# Patient Record
Sex: MALE | Race: WHITE | NOT HISPANIC OR LATINO | URBAN - METROPOLITAN AREA
[De-identification: names, ages, dates, MRNs, and addresses within clinical notes are randomized per-mention and may not be internally consistent; named-entity substitution may affect disease eponyms.]

---

## 2023-11-08 PROBLEM — R10.9 ABDOMINAL PAIN: Status: RESOLVED | Noted: 2023-11-08 | Resolved: 2023-11-08

## 2023-11-08 PROBLEM — C92.00 AML (ACUTE MYELOID LEUKEMIA) (MULTI): Status: ACTIVE | Noted: 2023-09-07

## 2023-11-08 PROBLEM — D72.829 LEUKOCYTOSIS: Status: RESOLVED | Noted: 2023-11-08 | Resolved: 2023-11-08

## 2023-11-08 PROBLEM — I10 HYPERTENSION: Status: ACTIVE | Noted: 2023-10-03

## 2023-11-08 PROBLEM — E66.9 OBESITY: Status: RESOLVED | Noted: 2023-11-08 | Resolved: 2023-11-08

## 2023-11-08 PROBLEM — D69.6 PLATELETS DECREASED (CMS-HCC): Status: ACTIVE | Noted: 2023-08-25

## 2023-11-08 PROBLEM — M45.9 ANKYLOSING SPONDYLITIS (MULTI): Status: ACTIVE | Noted: 2023-11-08

## 2023-11-08 PROBLEM — E78.5 HYPERLIPIDEMIA: Status: ACTIVE | Noted: 2023-10-03

## 2023-11-08 PROBLEM — D61.818 PANCYTOPENIA (MULTI): Status: ACTIVE | Noted: 2023-10-02

## 2023-11-08 PROBLEM — M51.9 INTERVERTEBRAL DISC DISORDER: Status: ACTIVE | Noted: 2023-11-08

## 2023-11-08 PROBLEM — I25.10 ARTERIOSCLEROTIC HEART DISEASE: Status: ACTIVE | Noted: 2023-11-08

## 2023-11-08 PROBLEM — U07.1 COVID-19: Status: RESOLVED | Noted: 2023-11-08 | Resolved: 2023-11-08

## 2023-11-08 RX ORDER — SODIUM CHLORIDE 0.9 % (FLUSH) 0.9 %
10 SYRINGE (ML) INJECTION
COMMUNITY
Start: 2023-09-29

## 2023-11-08 RX ORDER — ACYCLOVIR 400 MG/1
400 TABLET ORAL 2 TIMES DAILY
COMMUNITY
Start: 2023-09-18

## 2023-11-08 RX ORDER — OMEPRAZOLE 40 MG/1
40 CAPSULE, DELAYED RELEASE ORAL
COMMUNITY

## 2023-11-08 RX ORDER — NAPROXEN SODIUM 220 MG/1
81 TABLET, FILM COATED ORAL DAILY
COMMUNITY

## 2023-11-08 RX ORDER — PRAVASTATIN SODIUM 20 MG/1
20 TABLET ORAL NIGHTLY
COMMUNITY
Start: 2019-05-24 | End: 2023-12-22 | Stop reason: SDUPTHER

## 2023-11-08 RX ORDER — PROCHLORPERAZINE MALEATE 10 MG
10 TABLET ORAL EVERY 6 HOURS PRN
COMMUNITY
Start: 2023-09-18

## 2023-11-08 RX ORDER — CYCLOBENZAPRINE HCL 10 MG
10 TABLET ORAL 3 TIMES DAILY PRN
COMMUNITY

## 2023-11-08 RX ORDER — ALLOPURINOL 300 MG/1
300 TABLET ORAL
COMMUNITY
Start: 2023-09-18

## 2023-11-08 RX ORDER — LISINOPRIL 20 MG/1
20 TABLET ORAL DAILY
COMMUNITY
End: 2023-12-22 | Stop reason: SDUPTHER

## 2023-11-08 RX ORDER — LANOLIN ALCOHOL/MO/W.PET/CERES
1000 CREAM (GRAM) TOPICAL
COMMUNITY

## 2023-11-08 RX ORDER — TRAMADOL HYDROCHLORIDE 50 MG/1
50 TABLET ORAL EVERY 8 HOURS PRN
COMMUNITY

## 2023-11-08 RX ORDER — LACTULOSE 10 G/15ML
10 SOLUTION ORAL EVERY 8 HOURS PRN
COMMUNITY
Start: 2023-09-23

## 2023-11-08 RX ORDER — POSACONAZOLE 100 MG/1
300 TABLET, DELAYED RELEASE ORAL
COMMUNITY
Start: 2023-09-08 | End: 2024-03-06

## 2023-12-22 DIAGNOSIS — I25.10 ARTERIOSCLEROTIC HEART DISEASE: Primary | ICD-10-CM

## 2023-12-22 DIAGNOSIS — E78.00 PURE HYPERCHOLESTEROLEMIA: ICD-10-CM

## 2023-12-22 RX ORDER — PRAVASTATIN SODIUM 20 MG/1
20 TABLET ORAL NIGHTLY
Qty: 90 TABLET | Refills: 3 | Status: SHIPPED | OUTPATIENT
Start: 2023-12-22 | End: 2023-12-27

## 2023-12-22 RX ORDER — LISINOPRIL 20 MG/1
20 TABLET ORAL DAILY
Qty: 90 TABLET | Refills: 3 | Status: SHIPPED | OUTPATIENT
Start: 2023-12-22 | End: 2023-12-27

## 2023-12-27 DIAGNOSIS — E78.00 PURE HYPERCHOLESTEROLEMIA: ICD-10-CM

## 2023-12-27 DIAGNOSIS — I25.10 ARTERIOSCLEROTIC HEART DISEASE: ICD-10-CM

## 2023-12-27 RX ORDER — PRAVASTATIN SODIUM 20 MG/1
20 TABLET ORAL DAILY
Qty: 90 TABLET | Refills: 3 | Status: SHIPPED | OUTPATIENT
Start: 2023-12-27

## 2023-12-27 RX ORDER — LISINOPRIL 20 MG/1
20 TABLET ORAL DAILY
Qty: 90 TABLET | Refills: 3 | Status: SHIPPED | OUTPATIENT
Start: 2023-12-27

## 2025-05-29 ENCOUNTER — OFFICE VISIT (OUTPATIENT)
Facility: CLINIC | Age: 70
End: 2025-05-29
Payer: MEDICARE

## 2025-05-29 VITALS
WEIGHT: 203 LBS | DIASTOLIC BLOOD PRESSURE: 80 MMHG | BODY MASS INDEX: 30.27 KG/M2 | HEART RATE: 82 BPM | OXYGEN SATURATION: 97 % | SYSTOLIC BLOOD PRESSURE: 130 MMHG

## 2025-05-29 DIAGNOSIS — I25.10 ARTERIOSCLEROTIC HEART DISEASE: ICD-10-CM

## 2025-05-29 DIAGNOSIS — E78.00 PURE HYPERCHOLESTEROLEMIA: ICD-10-CM

## 2025-05-29 DIAGNOSIS — E78.5 HYPERLIPIDEMIA: ICD-10-CM

## 2025-05-29 DIAGNOSIS — I10 HYPERTENSION: Primary | ICD-10-CM

## 2025-05-29 PROCEDURE — 1036F TOBACCO NON-USER: CPT | Performed by: INTERNAL MEDICINE

## 2025-05-29 PROCEDURE — 99214 OFFICE O/P EST MOD 30 MIN: CPT | Performed by: INTERNAL MEDICINE

## 2025-05-29 PROCEDURE — 3075F SYST BP GE 130 - 139MM HG: CPT | Performed by: INTERNAL MEDICINE

## 2025-05-29 PROCEDURE — 3079F DIAST BP 80-89 MM HG: CPT | Performed by: INTERNAL MEDICINE

## 2025-05-29 PROCEDURE — 1126F AMNT PAIN NOTED NONE PRSNT: CPT | Performed by: INTERNAL MEDICINE

## 2025-05-29 PROCEDURE — 1159F MED LIST DOCD IN RCRD: CPT | Performed by: INTERNAL MEDICINE

## 2025-05-29 RX ORDER — NAPROXEN SODIUM 220 MG/1
81 TABLET, FILM COATED ORAL DAILY
Qty: 90 TABLET | Refills: 3 | Status: SHIPPED | OUTPATIENT
Start: 2025-05-29 | End: 2026-05-29

## 2025-05-29 RX ORDER — SUCRALFATE 1 G/1
1 TABLET ORAL
COMMUNITY

## 2025-05-29 RX ORDER — LISINOPRIL 20 MG/1
20 TABLET ORAL DAILY
Qty: 90 TABLET | Refills: 3 | Status: SHIPPED | OUTPATIENT
Start: 2025-05-29

## 2025-05-29 RX ORDER — PRAVASTATIN SODIUM 20 MG/1
20 TABLET ORAL DAILY
Qty: 90 TABLET | Refills: 3 | Status: SHIPPED | OUTPATIENT
Start: 2025-05-29

## 2025-05-29 ASSESSMENT — PATIENT HEALTH QUESTIONNAIRE - PHQ9
SUM OF ALL RESPONSES TO PHQ9 QUESTIONS 1 AND 2: 0
2. FEELING DOWN, DEPRESSED OR HOPELESS: NOT AT ALL
1. LITTLE INTEREST OR PLEASURE IN DOING THINGS: NOT AT ALL

## 2025-05-29 ASSESSMENT — PAIN SCALES - GENERAL: PAINLEVEL_OUTOF10: 0-NO PAIN

## 2025-05-29 ASSESSMENT — ENCOUNTER SYMPTOMS
DEPRESSION: 0
LOSS OF SENSATION IN FEET: 0
OCCASIONAL FEELINGS OF UNSTEADINESS: 0

## 2025-05-29 ASSESSMENT — LIFESTYLE VARIABLES: TOTAL SCORE: 0

## 2025-05-29 NOTE — PROGRESS NOTES
[facility] [place]        Subjective   Chief Complaint   Patient presents with    overdue follow up       Seen by Dr. 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    From a cardiology standpoint he was being seen because of a strong family history of premature coronary artery disease and has no history of myocardial infarction heart failure or valvular heart disease and no syncope or sustained arrhythmias.  He has been on chronic statin therapy long-term with a normal functional capacity and angina free.    He has a past medical history of Leukocytosis (11/08/2023).  He has a past surgical history that includes IR CVC PICC (3/4/2024).   No relevant family history has been documented for this patient.  Current Outpatient Medications   Medication Sig Dispense Refill    acyclovir (Zovirax) 400 mg tablet Take 1 tablet (400 mg) by mouth twice a day.      pravastatin (Pravachol) 20 mg tablet TAKE 1 TABLET BY MOUTH EVERY DAY 90 tablet 3    sucralfate (Carafate) 1 gram tablet Take 1 tablet (1 g) by mouth 4 times a day before meals.      TURMERIC ORAL Take by mouth.      allopurinol (Zyloprim) 300 mg tablet Take 1 tablet (300 mg) by mouth once daily. (Patient not taking: Reported on 5/29/2025)      aspirin 81 mg chewable tablet Chew 1 tablet (81 mg) once daily. (Patient not taking: Reported on 5/29/2025)      cyanocobalamin (Vitamin B-12) 1,000 mcg tablet Take 1 tablet (1,000 mcg) by mouth once daily. (Patient not taking: Reported on 5/29/2025)      cyclobenzaprine (Flexeril) 10 mg tablet Take 1 tablet (10 mg) by mouth 3 times a day as needed for muscle spasms. (Patient not taking: Reported on 5/29/2025)      FERROUS SULFATE ORAL Take 1 tablet by  mouth once daily. (Patient not taking: Reported on 5/29/2025)      lactulose 20 gram/30 mL oral solution Take 15 mL (10 g) by mouth every 8 hours if needed. (Patient not taking: Reported on 5/29/2025)      lisinopril 20 mg tablet TAKE 1 TABLET BY MOUTH EVERY DAY (Patient not taking: Reported on 5/29/2025) 90 tablet 3    MAGNESIUM CITRATE ORAL Take 250 mg by mouth once daily. (Patient not taking: Reported on 5/29/2025)      multivit-minerals/folic acid (CENTRUM ADULTS ORAL) Take 1 tablet by mouth once daily. (Patient not taking: Reported on 5/29/2025)      omeprazole (PriLOSEC) 40 mg DR capsule Take 1 capsule (40 mg) by mouth once daily in the morning. Take before meals.      prochlorperazine (Compazine) 10 mg tablet Take 1 tablet (10 mg) by mouth every 6 hours if needed for nausea or vomiting. (Patient not taking: Reported on 5/29/2025)      sodium chloride 0.9% (sodium chloride) flush Infuse 10 mL into a venous catheter once daily. (Patient not taking: Reported on 5/29/2025)      traMADol (Ultram) 50 mg tablet Take 1 tablet (50 mg) by mouth every 8 hours if needed for moderate pain (4 - 6). (Patient not taking: Reported on 5/29/2025)      turm/ging/walt/yuc/km/brandy/hor (TUMERSAID ORAL) Take 400 mg by mouth once daily. (Patient not taking: Reported on 5/29/2025)      venetoclax (Venclexta) 100 mg tablet Take 1 tablet (100 mg total) by mouth once daily. (Patient not taking: Reported on 5/29/2025)       No current facility-administered medications for this visit.      reports that he has never smoked. He has never been exposed to tobacco smoke. He has never used smokeless tobacco. He reports current alcohol use. He reports that he does not use drugs.  Allergies:  Iodine and Shellfish derived    ROS: See HPI  CONSTITUTIONAL: Chills- none. Fever- none. Weight change appropriate for age.  HEENT: Headache- Negative.  Change in vision- none.  Ear pain- none. Nasal congestion- none. Post-nasal drip-none.  Sore  "throat-none.  CARDIOLOGY: Chest pain- none.  Leg edema-trace.  Murmurs-soft systolic.  Palpitation- none.  RESPIRATORY: Denies any shortness of breath.  GI: Abdominal pain- none.  Change in bowel habits- none.  Constipation- none.  Diarrhea- none.  Nausea- none.  Vomiting- none.  MUSCULOSKELETAL: Joint pain- none.  Muscle aches- none.  DERMATOLOGY: Rash- none.  NEUROLOGY: Dizziness- none.   Headache- none.  PSYCHIATRY: Denies any depression or anxiety     Vitals:    05/29/25 0933   BP: 130/80   Pulse: 82   SpO2: 97%   Weight: 92.1 kg (203 lb)   PainSc: 0-No pain      BMI:Body mass index is 30.27 kg/m².   General Cardiology:  General Appearance: Alert, oriented and in no acute distress.  HEENT: extra ocular movements intact (EOMI), pupils equal,  round, reactive to light and accommodation (PERRLA).  Carotid Upstroke: no bruit, normal.  Jugular Venous Distention (JVD): flat.  Chest: normal.  Lungs: Clear to auscultation,   Heart: Regular rate and rhythm with normal S1-S2 and no murmurs gallops rubs or clicks  Abdomen: no hepatomegaly, no masses felt, soft.  Extremities: no leg edema.  Peripheral pulses: 2 plus bilateral.  NEUROLOGY Cranial nerves II-XII grossly intact.     Last Labs:  CMP:  Recent Labs     10/14/22  1001 09/09/22  0830 08/14/19  1042 08/10/19  1907    137 139 135   K 4.4 4.5 4.3 4.4    100 104 99   CO2 26 27 24 26   ANIONGAP 14 10 11 10   BUN 17 17 18 19   CREATININE 1.10 1.1 1.1 1.1   EGFR  --  74 72 72   GLUCOSE 97 97 112* 98     Recent Labs     10/14/22  1001 09/09/22  0830 08/14/19  1042   ALBUMIN 4.4 4.2 4.3   ALKPHOS 74 78 83   ALT 18 17 18   AST 17 14 17   BILITOT 0.4 0.4 0.3     CBC:  Recent Labs     10/14/22  1001 09/15/22  1315 08/14/19  1042   WBC 1.7* 1.9* 4.2*   HGB 13.3* 12.1* 10.8*   HCT 39.5* 35.6* 32.7*   * 110* 248   * 111.3* 95.6     COAG: No results for input(s): \"INR\", \"DDIMERVTE\" in the last 53238 hours.  HEME/ENDO:  Recent Labs     03/03/25  0812 " 10/14/22  1110 10/14/22  1001 06/27/19  0840 07/02/18  1235   FERRITIN  --  51  --   --   --    TSH  --   --  0.70 1.96 0.71   HGBA1C 5.5  --   --   --  5.1      CARDIAC:   Recent Labs     10/14/22  1001        Recent Labs     03/18/21  1007 06/27/19  0840 07/02/18  1235   CHOL 157 174 117*   LDLCALC 95 113 61*   HDL 49 44 42   TRIG 64 86 70       Last Cardiology Tests:  Echo:  Echo Results:  No results found for this or any previous visit from the past 3650 days.     Cath:  Stress Test:  Stress Results:  No results found for this or any previous visit from the past 365 days.     Cardiac Imaging:    Problem List Items Addressed This Visit       Hyperlipidemia    Hypertension - Primary      Regarding his hyperlipidemia he has well-controlled levels on his statin therapy and should focus on a Mediterranean/[place] dietary lifestyle    avoid lunch meats, canned soups, pizzas, bread rolls, and sandwiches. Advised patient to limit salt intake 1,500 mg daily. Advised patient to exercise 30 mins/5 times a week including treadmill or aerobic type, Goal to achieve 65% target HR.    1 year follow-up with me or sooner for active cardiac issues and medications were refilled  Pt. care time is spent includes review of diagnostic tests, labs, radiographs, EKGs, old echoes, cardiac work-up and coordination of care. Assessment, impression and plans are reflected in the note above as well as the orders.    Pacheco Espinoza,   [facility] [place]  [facility]